# Patient Record
Sex: FEMALE | Race: BLACK OR AFRICAN AMERICAN | Employment: UNEMPLOYED | URBAN - METROPOLITAN AREA
[De-identification: names, ages, dates, MRNs, and addresses within clinical notes are randomized per-mention and may not be internally consistent; named-entity substitution may affect disease eponyms.]

---

## 2021-01-29 ENCOUNTER — OFFICE VISIT (OUTPATIENT)
Dept: OTOLARYNGOLOGY | Facility: CLINIC | Age: 41
End: 2021-01-29
Payer: MEDICAID

## 2021-01-29 VITALS
HEART RATE: 96 BPM | WEIGHT: 145 LBS | TEMPERATURE: 97 F | SYSTOLIC BLOOD PRESSURE: 107 MMHG | HEIGHT: 61 IN | BODY MASS INDEX: 27.38 KG/M2 | DIASTOLIC BLOOD PRESSURE: 83 MMHG

## 2021-01-29 DIAGNOSIS — R49.0 HOARSENESS: Primary | ICD-10-CM

## 2021-01-29 PROCEDURE — 99203 OFFICE O/P NEW LOW 30 MIN: CPT | Performed by: OTOLARYNGOLOGY

## 2021-01-29 PROCEDURE — 3079F DIAST BP 80-89 MM HG: CPT | Performed by: OTOLARYNGOLOGY

## 2021-01-29 PROCEDURE — 3074F SYST BP LT 130 MM HG: CPT | Performed by: OTOLARYNGOLOGY

## 2021-01-29 PROCEDURE — 3008F BODY MASS INDEX DOCD: CPT | Performed by: OTOLARYNGOLOGY

## 2021-01-29 RX ORDER — ALBUTEROL SULFATE 90 UG/1
2 AEROSOL, METERED RESPIRATORY (INHALATION) EVERY 6 HOURS PRN
COMMUNITY
Start: 2020-12-31

## 2021-01-29 RX ORDER — BUSPIRONE HYDROCHLORIDE 7.5 MG/1
7.5 TABLET ORAL NIGHTLY PRN
COMMUNITY
Start: 2021-01-13

## 2021-01-29 RX ORDER — CYCLOBENZAPRINE HYDROCHLORIDE 7.5 MG/1
7.5 TABLET, FILM COATED ORAL 2 TIMES DAILY PRN
COMMUNITY
Start: 2021-01-14

## 2021-01-29 RX ORDER — DESLORATADINE 5 MG/1
TABLET ORAL
COMMUNITY
Start: 2020-10-12

## 2021-01-29 NOTE — PROGRESS NOTES
Tahmina Saul is a 45year old female. Patient presents with:  Voice Problem: voice hoarseness for past month, straining a bit, no pain     HPI:   For the last month or so she has been experiencing a lot of problems with hoarseness in her voice.   She report Normal   Neurological Normal Memory - Normal. Cranial nerves - Cranial nerves II through XII grossly intact.    Neck Exam Normal Inspection - Normal. Palpation - Normal. Parotid gland - Normal. Thyroid gland - Normal.   Psychiatric Normal Orientation - Abdulkadir Hoff

## 2021-02-02 ENCOUNTER — TELEPHONE (OUTPATIENT)
Dept: OTOLARYNGOLOGY | Facility: CLINIC | Age: 41
End: 2021-02-02

## 2021-02-04 ENCOUNTER — TELEPHONE (OUTPATIENT)
Dept: OTOLARYNGOLOGY | Facility: CLINIC | Age: 41
End: 2021-02-04

## 2021-02-04 NOTE — TELEPHONE ENCOUNTER
Rn informed patient that scope in the office has been authorized [id number] valid from 2/2/21 thru 4/3/21,pt verbalized understanding and scheduled.

## 2021-02-06 ENCOUNTER — OFFICE VISIT (OUTPATIENT)
Dept: OTOLARYNGOLOGY | Facility: CLINIC | Age: 41
End: 2021-02-06
Payer: MEDICAID

## 2021-02-06 VITALS
BODY MASS INDEX: 27 KG/M2 | TEMPERATURE: 98 F | DIASTOLIC BLOOD PRESSURE: 70 MMHG | SYSTOLIC BLOOD PRESSURE: 110 MMHG | WEIGHT: 145 LBS

## 2021-02-06 DIAGNOSIS — J38.2 VOCAL CORD NODULE: Primary | ICD-10-CM

## 2021-02-06 PROCEDURE — 31575 DIAGNOSTIC LARYNGOSCOPY: CPT | Performed by: OTOLARYNGOLOGY

## 2021-02-06 PROCEDURE — 3078F DIAST BP <80 MM HG: CPT | Performed by: OTOLARYNGOLOGY

## 2021-02-06 PROCEDURE — 99213 OFFICE O/P EST LOW 20 MIN: CPT | Performed by: OTOLARYNGOLOGY

## 2021-02-06 PROCEDURE — 3074F SYST BP LT 130 MM HG: CPT | Performed by: OTOLARYNGOLOGY

## 2021-02-06 RX ORDER — IBUPROFEN 800 MG/1
800 TABLET ORAL 3 TIMES DAILY PRN
COMMUNITY
Start: 2020-12-11

## 2021-02-06 RX ORDER — FLUCONAZOLE 150 MG/1
150 TABLET ORAL ONCE
COMMUNITY
Start: 2021-01-20

## 2021-02-06 NOTE — PROGRESS NOTES
Erica eLon is a 45year old female. Patient presents with:  Throat Problem: Hoarseness f/u    HPI:   She continues to have a lot of issues with hoarseness in her voice. She is not having any pain in her throat.   She is not having any difficulty eating d Inspection - Normal. Palpation - Normal.   Procedures:  Endoscopy/Laryngoscopy  Pre-Procedure Care: Verbal consent was obtained. Procedure/risks were explained. Questions were answered. Correct patient identified. Correct side and site confirmed.       A to mouth and advanced  into the interior of the larynx. A thorough examination of the interior of the larynx was performed. Findings were as follows.        Hypopharynx/Larynx:  Epiglottis is normal.  Arytenoids:  Bilateral: Arytenoids are normal.  Vocal fol

## 2021-02-17 ENCOUNTER — APPOINTMENT (OUTPATIENT)
Dept: SPEECH THERAPY | Facility: HOSPITAL | Age: 41
End: 2021-02-17
Attending: OTOLARYNGOLOGY
Payer: MEDICAID

## 2021-02-24 ENCOUNTER — OFFICE VISIT (OUTPATIENT)
Dept: SPEECH THERAPY | Facility: HOSPITAL | Age: 41
End: 2021-02-24
Attending: OTOLARYNGOLOGY
Payer: MEDICAID

## 2021-02-24 DIAGNOSIS — J38.2 VOCAL CORD NODULE: ICD-10-CM

## 2021-02-24 PROCEDURE — 92524 BEHAVRAL QUALIT ANALYS VOICE: CPT

## 2021-02-24 NOTE — PATIENT INSTRUCTIONS
VOCAL HYGIENE PROGRAM     Establishing Good Vocal Health    The following suggestions can be implemented to prevent vocal fold pathology, eliminate vocal fold pathology, and to eliminate vocal qualities, which are distracting to the listener.     Andrews Oconnell respiratory    infection    Other general suggestions:   1. Avoid smoking and smoky environments   2. Wear a mask with airborne irritants (mowing lawn, heavy cleaning, etc.)   3.  Be aware of side effects of both prescription and over the counter    medicat

## 2021-02-24 NOTE — PROGRESS NOTES
ADULT VOICE EVALUATION:   Referring Physician: Dr. Valencia Fairly V  Diagnosis: bilateral vocal cord nodules     Date of Service: 2/24/2021     PATIENT Collin Elizabeth is a 45year old y/o female who presents to therapy today with complaints of hoarseness t Consistent and Score: 28/100  Roughness: Mild-Moderately Deviant, Consistent and Score: 25/100  Breathiness: Mild-Moderately Deviant, Consistent and Score: 20/100  Strain: Mild-Moderately Deviant, Consistent and Score: 25/100  Pitch: WNL  Loudness:  WNL min          PLAN OF CARE:    Goals: The patient will adhere to a vocal hygiene program inlcuding increasing completing general and vocal relaxation exercises daily with 90% accuracy with minimal assistance.     The patient will utilize diaphragmatic anat 2/24/2021  To:5/25/2021

## 2021-03-03 ENCOUNTER — APPOINTMENT (OUTPATIENT)
Dept: SPEECH THERAPY | Facility: HOSPITAL | Age: 41
End: 2021-03-03
Attending: OTOLARYNGOLOGY
Payer: MEDICAID

## 2021-03-10 ENCOUNTER — OFFICE VISIT (OUTPATIENT)
Dept: SPEECH THERAPY | Facility: HOSPITAL | Age: 41
End: 2021-03-10
Attending: OTOLARYNGOLOGY
Payer: MEDICAID

## 2021-03-10 PROCEDURE — 92507 TX SP LANG VOICE COMM INDIV: CPT

## 2021-03-10 NOTE — PROGRESS NOTES
Diagnosis:  vocal cord nodules  Authorized # of Visits:  6         Next MD visit: none scheduled  Fall Risk: standard         Precautions: Covid-19 PPE             Subjective: \"My anxiety is really high now. \"  Pt describes high anxiety especially when sh diaphragmatic breathing in conversation with 90% accuracy. The patent will use facial focus and easy onset to phonation in words, sentences and rote language with 90% accuracy.     The patient will reduced strained, hoarse, harsh vocal quality and reduce

## 2021-03-10 NOTE — PATIENT INSTRUCTIONS
VOICE THERAPY EXERCISES FOR FACIAL FOCUS    1. Establish the sound and feel of your voice in the front of your face. Practice:  Prolonged nasal sounds on /n/ and /m/. Humming  2.   Practice prolonged nasal sounds in single syllables feeling the sound com

## 2021-03-17 ENCOUNTER — OFFICE VISIT (OUTPATIENT)
Dept: SPEECH THERAPY | Facility: HOSPITAL | Age: 41
End: 2021-03-17
Attending: OTOLARYNGOLOGY
Payer: MEDICAID

## 2021-03-17 PROCEDURE — 92507 TX SP LANG VOICE COMM INDIV: CPT

## 2021-03-17 NOTE — PROGRESS NOTES
Diagnosis:  vocal cord nodules  Authorized # of Visits:  6         Next MD visit: none scheduled  Fall Risk: standard         Precautions: Covid-19 PPE             Subjective: Pt upset with flat tire. I didn't have my voice for 3 days.   It just came slowl was told that using the techniques takes some practice and work and time and encouraged her to continue practicing and continue therapy for 1-2 more sessions. Goals:    The patient will adhere to a vocal hygiene program inlcuding increasing completing g

## 2021-03-24 ENCOUNTER — TELEPHONE (OUTPATIENT)
Dept: OTOLARYNGOLOGY | Facility: CLINIC | Age: 41
End: 2021-03-24

## 2021-03-24 ENCOUNTER — OFFICE VISIT (OUTPATIENT)
Dept: SPEECH THERAPY | Facility: HOSPITAL | Age: 41
End: 2021-03-24
Attending: OTOLARYNGOLOGY
Payer: MEDICAID

## 2021-03-24 PROCEDURE — 92507 TX SP LANG VOICE COMM INDIV: CPT

## 2021-03-24 NOTE — TELEPHONE ENCOUNTER
Dr Laura Velasquez patient stated the hoarseness got worse ,still on therapy asking if patient can take medication steroid you mentioned on her last visit,please advise.

## 2021-03-24 NOTE — TELEPHONE ENCOUNTER
Per pt requesting to speak to Dr. Leocadia Mcburney regarding medication discussed at last [address]. Please call thank you.

## 2021-03-24 NOTE — PROGRESS NOTES
Diagnosis:  vocal cord nodules  Authorized # of Visits:  6         Next MD visit: none scheduled  Fall Risk: standard         Precautions: Covid-19 PPE             Subjective: Pt upset with flat tire. I didn't have my voice for 3 days.   It just came slowl accuracy which is less than previous sessions. Goals: The patient will adhere to a vocal hygiene program inlcuding increasing completing general and vocal relaxation exercises daily with 90% accuracy with minimal assistance.     The patient will [facility]

## 2021-03-25 ENCOUNTER — TELEPHONE (OUTPATIENT)
Dept: OTOLARYNGOLOGY | Facility: CLINIC | Age: 41
End: 2021-03-25

## 2021-03-25 NOTE — TELEPHONE ENCOUNTER
I would not recommend any other medication right now.   I would have her finish her speech therapy and then follow-up with me if she is still having problems

## 2021-03-25 NOTE — TELEPHONE ENCOUNTER
Per pt was instructed by [facility] with speech therapy that she cannot fill FMLA forms. Pt states that she will drop them off to the office.  Thank you

## 2021-04-01 ENCOUNTER — MED REC SCAN ONLY (OUTPATIENT)
Dept: ADMINISTRATIVE | Age: 41
End: 2021-04-01

## 2021-04-02 NOTE — TELEPHONE ENCOUNTER
Tried to call pt to obtain details for fmla. Unable to place call connection was poor on Remind.  Sent [employer] message to have PT call us to obtain details regarding her fmla request.

## 2021-04-02 NOTE — TELEPHONE ENCOUNTER
Patient needs FMLA to start 3/24/21 and she works 12 hour days, 6 days a week and talks all day. We discussed options and she wants to work so she  wants to do a reduced schedule, 4 days a week max 6-10 hours. This will allow her to rest her voice more.

## 2021-04-03 ENCOUNTER — EKG ENCOUNTER (OUTPATIENT)
Dept: LAB | Facility: HOSPITAL | Age: 41
End: 2021-04-03
Attending: INTERNAL MEDICINE
Payer: MEDICAID

## 2021-04-03 DIAGNOSIS — Z01.818 PRE-OP EXAMINATION: ICD-10-CM

## 2021-04-03 DIAGNOSIS — Z01.818 PRE-OP EXAMINATION: Primary | ICD-10-CM

## 2021-04-03 PROCEDURE — 84703 CHORIONIC GONADOTROPIN ASSAY: CPT

## 2021-04-03 PROCEDURE — 85025 COMPLETE CBC W/AUTO DIFF WBC: CPT

## 2021-04-03 PROCEDURE — 83036 HEMOGLOBIN GLYCOSYLATED A1C: CPT

## 2021-04-03 PROCEDURE — 84439 ASSAY OF FREE THYROXINE: CPT

## 2021-04-03 PROCEDURE — 87389 HIV-1 AG W/HIV-1&-2 AB AG IA: CPT

## 2021-04-03 PROCEDURE — 80053 COMPREHEN METABOLIC PANEL: CPT

## 2021-04-03 PROCEDURE — 36415 COLL VENOUS BLD VENIPUNCTURE: CPT

## 2021-04-03 PROCEDURE — 84443 ASSAY THYROID STIM HORMONE: CPT

## 2021-04-03 PROCEDURE — 85730 THROMBOPLASTIN TIME PARTIAL: CPT

## 2021-04-03 PROCEDURE — 84481 FREE ASSAY (FT-3): CPT

## 2021-04-03 PROCEDURE — 85610 PROTHROMBIN TIME: CPT

## 2021-04-03 PROCEDURE — 93010 ELECTROCARDIOGRAM REPORT: CPT | Performed by: INTERNAL MEDICINE

## 2021-04-03 PROCEDURE — 93005 ELECTROCARDIOGRAM TRACING: CPT

## 2021-04-06 ENCOUNTER — APPOINTMENT (OUTPATIENT)
Dept: SPEECH THERAPY | Facility: HOSPITAL | Age: 41
End: 2021-04-06
Attending: OTOLARYNGOLOGY
Payer: MEDICAID

## 2021-04-06 ENCOUNTER — TELEPHONE (OUTPATIENT)
Dept: SPEECH THERAPY | Facility: HOSPITAL | Age: 41
End: 2021-04-06

## 2021-04-06 NOTE — TELEPHONE ENCOUNTER
Called patient as she NS for her voice therapy session this morning. LM to remind her of her next appointment.   Alon Peacock MA/CCC-SLP  Speech Language Pathologist  [address] [facility]  [phone]

## 2021-04-08 NOTE — TELEPHONE ENCOUNTER
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 Please sign off on form:  -Highlight the patient and hit \"Chart\" button.   -In Chart Review, w/in the Encounter tab - click 1 time on the Telephone call encounter for 3/25/21 Scroll down the telephon

## 2021-04-13 ENCOUNTER — APPOINTMENT (OUTPATIENT)
Dept: SPEECH THERAPY | Facility: HOSPITAL | Age: 41
End: 2021-04-13
Attending: OTOLARYNGOLOGY
Payer: MEDICAID

## 2021-04-27 ENCOUNTER — APPOINTMENT (OUTPATIENT)
Dept: SPEECH THERAPY | Facility: HOSPITAL | Age: 41
End: 2021-04-27
Attending: OTOLARYNGOLOGY
Payer: MEDICAID

## 2021-06-07 ENCOUNTER — TELEPHONE (OUTPATIENT)
Dept: OTOLARYNGOLOGY | Facility: CLINIC | Age: 41
End: 2021-06-07

## 2021-06-07 NOTE — TELEPHONE ENCOUNTER
Pt calling states needs a RTW letter with any restrictions included states returning the 14th please advise       Fax # [phone]  Asking for a call before faxed over

## 2022-02-09 ENCOUNTER — TELEPHONE (OUTPATIENT)
Dept: OBGYN CLINIC | Facility: CLINIC | Age: 42
End: 2022-02-09

## 2022-02-09 NOTE — TELEPHONE ENCOUNTER
LMP 1/12, periods regular every 28 days. Pt agrees to see all providers. OBN appt scheduled on 2/26. Pt states she will want the genetic testing that can find out the gender. Pt states she has 2 sons and if this baby is a boy she will want to terminate. Pt advised to discuss with OBN. Checked with Rafaela Pedro. Gender and genetic testing is done at 12 weeks.

## 2022-02-18 NOTE — TELEPHONE ENCOUNTER
Pt asking when is the earliest she can have genetic testing. Informed pt it is done at 11-13 weeks. Pt states her cousin had testing at 9 weeks. Informed pt nurse does not know what test her cousin did but ours start at 5 weeks. Reminded pt her OBN appt on 2/26 is phone call.

## 2022-02-26 ENCOUNTER — NURSE ONLY (OUTPATIENT)
Dept: OBGYN CLINIC | Facility: CLINIC | Age: 42
End: 2022-02-26
Payer: MEDICAID

## 2022-02-26 ENCOUNTER — LAB ENCOUNTER (OUTPATIENT)
Dept: LAB | Facility: HOSPITAL | Age: 42
End: 2022-02-26
Attending: OBSTETRICS & GYNECOLOGY
Payer: MEDICAID

## 2022-02-26 ENCOUNTER — TELEPHONE (OUTPATIENT)
Dept: OBGYN CLINIC | Facility: CLINIC | Age: 42
End: 2022-02-26

## 2022-02-26 DIAGNOSIS — O26.859 SPOTTING IN EARLY PREGNANCY: ICD-10-CM

## 2022-02-26 DIAGNOSIS — R39.15 URGENCY OF URINATION: ICD-10-CM

## 2022-02-26 DIAGNOSIS — M54.9 ACUTE LEFT-SIDED BACK PAIN, UNSPECIFIED BACK LOCATION: ICD-10-CM

## 2022-02-26 DIAGNOSIS — Z34.81 ENCOUNTER FOR SUPERVISION OF OTHER NORMAL PREGNANCY IN FIRST TRIMESTER: Primary | ICD-10-CM

## 2022-02-26 DIAGNOSIS — Z34.81 ENCOUNTER FOR SUPERVISION OF OTHER NORMAL PREGNANCY IN FIRST TRIMESTER: ICD-10-CM

## 2022-02-26 LAB
ANTIBODY SCREEN: NEGATIVE
B-HCG SERPL-ACNC: ABNORMAL MIU/ML
BASOPHILS # BLD AUTO: 0.03 X10(3) UL (ref 0–0.2)
BASOPHILS NFR BLD AUTO: 0.4 %
BILIRUB UR QL: NEGATIVE
COLOR UR: YELLOW
DEPRECATED RDW RBC AUTO: 44.1 FL (ref 35.1–46.3)
EOSINOPHIL # BLD AUTO: 0.23 X10(3) UL (ref 0–0.7)
EOSINOPHIL NFR BLD AUTO: 2.9 %
ERYTHROCYTE [DISTWIDTH] IN BLOOD BY AUTOMATED COUNT: 11.8 % (ref 11–15)
GLUCOSE UR-MCNC: NEGATIVE MG/DL
HBV SURFACE AG SER-ACNC: <0.1 [IU]/L
HBV SURFACE AG SERPL QL IA: NONREACTIVE
HCT VFR BLD AUTO: 38.5 %
HCV AB SERPL QL IA: NONREACTIVE
HGB BLD-MCNC: 12.5 G/DL
HGB UR QL STRIP.AUTO: NEGATIVE
IMM GRANULOCYTES # BLD AUTO: 0.03 X10(3) UL (ref 0–1)
IMM GRANULOCYTES NFR BLD: 0.4 %
KETONES UR-MCNC: NEGATIVE MG/DL
LYMPHOCYTES # BLD AUTO: 2.4 X10(3) UL (ref 1–4)
LYMPHOCYTES NFR BLD AUTO: 29.9 %
MCH RBC QN AUTO: 33.1 PG (ref 26–34)
MCHC RBC AUTO-ENTMCNC: 32.5 G/DL (ref 31–37)
MCV RBC AUTO: 101.9 FL
MONOCYTES # BLD AUTO: 0.84 X10(3) UL (ref 0.1–1)
MONOCYTES NFR BLD AUTO: 10.5 %
NEUTROPHILS # BLD AUTO: 4.5 X10 (3) UL (ref 1.5–7.7)
NEUTROPHILS # BLD AUTO: 4.5 X10(3) UL (ref 1.5–7.7)
NEUTROPHILS NFR BLD AUTO: 55.9 %
NITRITE UR QL STRIP.AUTO: NEGATIVE
PH UR: 7 [PH] (ref 5–8)
PLATELET # BLD AUTO: 284 10(3)UL (ref 150–450)
PROT UR-MCNC: NEGATIVE MG/DL
RBC # BLD AUTO: 3.78 X10(6)UL
RH BLOOD TYPE: POSITIVE
RUBV IGG SER QL: POSITIVE
RUBV IGG SER-ACNC: 11.7 IU/ML (ref 10–?)
SP GR UR STRIP: 1.02 (ref 1–1.03)
UROBILINOGEN UR STRIP-ACNC: <2
WBC # BLD AUTO: 8 X10(3) UL (ref 4–11)

## 2022-02-26 PROCEDURE — 87086 URINE CULTURE/COLONY COUNT: CPT

## 2022-02-26 PROCEDURE — 87340 HEPATITIS B SURFACE AG IA: CPT

## 2022-02-26 PROCEDURE — 87389 HIV-1 AG W/HIV-1&-2 AB AG IA: CPT

## 2022-02-26 PROCEDURE — 84702 CHORIONIC GONADOTROPIN TEST: CPT

## 2022-02-26 PROCEDURE — 85660 RBC SICKLE CELL TEST: CPT

## 2022-02-26 PROCEDURE — 81001 URINALYSIS AUTO W/SCOPE: CPT

## 2022-02-26 PROCEDURE — 86803 HEPATITIS C AB TEST: CPT

## 2022-02-26 PROCEDURE — 86900 BLOOD TYPING SEROLOGIC ABO: CPT

## 2022-02-26 PROCEDURE — 86850 RBC ANTIBODY SCREEN: CPT

## 2022-02-26 PROCEDURE — 85025 COMPLETE CBC W/AUTO DIFF WBC: CPT

## 2022-02-26 PROCEDURE — 86762 RUBELLA ANTIBODY: CPT

## 2022-02-26 PROCEDURE — 86780 TREPONEMA PALLIDUM: CPT

## 2022-02-26 PROCEDURE — 87077 CULTURE AEROBIC IDENTIFY: CPT

## 2022-02-26 PROCEDURE — 36415 COLL VENOUS BLD VENIPUNCTURE: CPT

## 2022-02-26 PROCEDURE — 86901 BLOOD TYPING SEROLOGIC RH(D): CPT

## 2022-02-26 RX ORDER — CHOLECALCIFEROL (VITAMIN D3) 25 MCG
1 TABLET,CHEWABLE ORAL DAILY
COMMUNITY

## 2022-02-26 NOTE — TELEPHONE ENCOUNTER
Qual order canceled and quant order placed for 2 to be done within 48-72 hours. Pt informed and advised to go to the ER if pain worsens. Pt also advised to call if bleeding increases to a flow. Pt will have blood and urine testing done today. Pt informed doctor on call will be aware of testing to watch for results. Message to [facility] to watch for UA, quant and blood type results.

## 2022-02-26 NOTE — TELEPHONE ENCOUNTER
Pt called service to discuss results. Had episode of spotting  Currently no pain. UA wnl. HCG 47k. O+.     Get US for threatened ab

## 2022-02-26 NOTE — TELEPHONE ENCOUNTER
Pt had her OBN PC appt today. Per LMP pt is 6w3d. Pt states last night she started experiencing left sided low back pain. Pt states it is tender to touch. Pt states it is not really a pain, but more a discomfort. Pt also had some light red spotting with wiping this am.  Pt denies and pelvic pain or cramping. Pt states she drinks a lot of water but her urine is still yellow. She is also experiencing frequency and urgency. States she has a hard time holding her urine. Pt advised frequency can be normal at this stage in the pregnancy. UA ordered to rule out UTI. Pt will come today and do UA and PN blood work, (minus the 1 hr gtt) so we can check blood type. Message to [facility] on call for further recs.

## 2022-02-26 NOTE — PROGRESS NOTES
Pt seen for OBN appt today with no complaints. Normal PN labs, qual, 1 hr gtt, hep c and sickle cell ordered. Pt advised all labs must be completed and resulted prior to MD appt. NPN appt with MD scheduled with CAP on 3/18/2022. Pt would like FTS. Pt does not eat or drink dairy, would like to discuss calcium supplements. Partner's name is Bianca Span contact #pt declines; race:  [place]  Occupation:   Pt race:     MEDICAL HISTORY    Anemia Yes    Anesthetic complications No    Anxiety/Depression  No    Autoimmune Disorder No    Asthma  Yes Seasonal asthma   Cancer No    Diabetes  No    Gyne/breast Surgery Yes Breast implants in 2019   Heart Disease No    Hepatitis/Liver Disease  No    History of blood transfusion No    History of abnormal pap No    Hypertension  No    Infertility  No    Kidney Disease/Frequent UTIs  No    Medication Allergies No    Latex Allergies No    Food Allergies  No    Neurological Disorder/Epilepsy No    Operations/Hospitalizations Yes  in  and   Breast augmentation in   Liposuction in    TB exposure  No    Thyroid Dysfunction No    Trauma/Violence  No    Uterine Anomaly  No    Uterine Fibroids  No    Variocosities/DVTs No    Smoker No    Drug usage in prior year No    Alcohol No    Would you accept a blood transfusion? If no, are you a Tenriism?  Yes    No            INFECTION HISTORY    Chlamydia No    Pt or partner have hx of Genital Herpes No    Gonorrhea No    Hepatitis B No    HIV No    HPV No    MRSA No    Syphilis No    Tattoos Yes Hep c ordered   Live with someone or Exposed to TB No    Rash or viral illness since LMP  No    Varicella No vaccinated   Recent Travel (or planned travel) to [facility] area for self and or partner No    Pets No        GENETICS SCREENING    Genetic Screening    Genetic Screening/Teratology Counseling- Includes patient, baby's father, or anyone in either family with:  Patient's age 28 years or older as of estimated date of delivery: Yes   Thalassemia ([facility], [place], [address], or  background): MCV less than 80: No   Neural tube defect (Meningomyelocele, Spina bifida, or Anencephaly): No   Congenital heart defect: No   Down syndrome: No   Josue-Sachs (Ashkenazi Yazdanism, [place], [place]): No   Canavan disease (Ashkenazi Yazdanism): No   Familial dysautonomia (Ashkenazi Yazdanism): No   Sickle cell disease or trait (): Yes (Comment: Pt's sister has sickle cell trait)   Hemophilia or other blood disorders: No   Muscular dystrophy: No    Cystic fibrosis: No   Marty's chorea: No   Intellectual disability and/or autism: No   Other inherited genetic or chromosomal disorder: No   Maternal metabolic disorder (eg. Type 1 diabetes, PKU): No   Patient or baby's father had child with birth defects not listed above: No   Recurrent pregnancy loss, or a stillbirth: No   Medications (including supplements, vitamins, herbs, or OTC drugs)/illicit/recreational drugs/alcohol since last menstrual period: Yes   If yes, agent(s) and strength/dosage: Pnv               MISC    Infant vaccinations  Yes       Pt. Has answered NO 5P questions and has NO  risk factors. Pt. Given What pregnant women need to know handout.

## 2022-02-26 NOTE — TELEPHONE ENCOUNTER
If pain worsens, needs to go to ER. Monie Chowdhury today & repeat in 48-72 hours.  Look for results & address w/ MD on call

## 2022-02-26 NOTE — TELEPHONE ENCOUNTER
Pt informed of JENNIFER's recs. Pt states she does not want to go to the ER. She states she only had the spotting with wiping once and she is not in pain, just some discomfort in her back. Pt states she did have intercourse last night as well. Pt is at the lab and will be doing her labs. Initial PN labs along with quant and UA. Pt advised again that the doctor feels the ER if the appropriate place to get care at this time. Pt declines going to the ER. JENNIFER informed.

## 2022-02-28 LAB — T PALLIDUM AB SER QL: NEGATIVE

## 2022-02-28 NOTE — TELEPHONE ENCOUNTER
Pt asked why an US is being ordered when she was originally told she could not get one at 13 weeks. Pt also asked why the RN said her hormone levels were high. Pt informed that the hormone levels are high enough that an US can be ordered. If the level was too low, nothing would be seen on an US. The levels are not abnormally high, just high enough that an US can show the baby. Pt informed the US is being ordered because she was having spotting. Pt expressed understanding.

## 2022-02-28 NOTE — TELEPHONE ENCOUNTER
Pt informed of results and recs for US. Order placed.   [facility] [employer] message sent with central scheduling number per pt's request.

## 2022-03-02 LAB — HGB S BLD QL SOLY: NEGATIVE

## 2022-03-03 ENCOUNTER — TELEPHONE (OUTPATIENT)
Dept: OBGYN CLINIC | Facility: CLINIC | Age: 42
End: 2022-03-03

## 2022-03-04 ENCOUNTER — HOSPITAL ENCOUNTER (OUTPATIENT)
Dept: ULTRASOUND IMAGING | Facility: HOSPITAL | Age: 42
Discharge: HOME | End: 2022-03-04
Attending: OBSTETRICS & GYNECOLOGY
Payer: MEDICAID

## 2022-03-04 ENCOUNTER — TELEPHONE (OUTPATIENT)
Dept: OBGYN CLINIC | Facility: CLINIC | Age: 42
End: 2022-03-04

## 2022-03-04 DIAGNOSIS — O20.0 THREATENED ABORTION, ANTEPARTUM: ICD-10-CM

## 2022-03-04 PROCEDURE — 76817 TRANSVAGINAL US OBSTETRIC: CPT | Performed by: OBSTETRICS & GYNECOLOGY

## 2022-03-04 PROCEDURE — 76801 OB US < 14 WKS SINGLE FETUS: CPT | Performed by: OBSTETRICS & GYNECOLOGY

## 2022-03-04 NOTE — TELEPHONE ENCOUNTER
US exam just ended. Result is not finalized, so nothing to review with JENNIFER or patient. Await finalized 1st Trim US report.

## 2022-03-14 ENCOUNTER — TELEPHONE (OUTPATIENT)
Dept: OBGYN CLINIC | Facility: CLINIC | Age: 42
End: 2022-03-14

## 2022-03-14 NOTE — TELEPHONE ENCOUNTER
Patient has been experiencing off and on headaches for last few weeks. Tylenol has not been helpful. Directed to take Tylenol ES prn per box instructions. Should try drinking a serving of caffeine, then resting quietly for 30-60 minutes. Can try cold compressed. She does not think she is drinking 64 oz fluids daily, encouraged to increase fluid intake. She is \"not a big water drinker. \" Advised she can drink flavored or sparkling water if that is more palatable. Patient verbalized understanding. She also notes she has completed all PN labs except 1 hr glucose d/t work schedule. She will try to go Thursday, otherwise Saturday.

## 2022-03-14 NOTE — TELEPHONE ENCOUNTER
Per pt wondering if what else she can take for headaches, states tylenol isnt working.  Please advise

## 2022-03-18 ENCOUNTER — INITIAL PRENATAL (OUTPATIENT)
Dept: OBGYN CLINIC | Facility: CLINIC | Age: 42
End: 2022-03-18
Payer: MEDICAID

## 2022-03-18 ENCOUNTER — TELEPHONE (OUTPATIENT)
Dept: OBGYN CLINIC | Facility: CLINIC | Age: 42
End: 2022-03-18

## 2022-03-18 VITALS
DIASTOLIC BLOOD PRESSURE: 72 MMHG | WEIGHT: 162.81 LBS | SYSTOLIC BLOOD PRESSURE: 107 MMHG | HEART RATE: 91 BPM | HEIGHT: 61.5 IN | BODY MASS INDEX: 30.35 KG/M2

## 2022-03-18 DIAGNOSIS — Z34.81 ENCOUNTER FOR SUPERVISION OF OTHER NORMAL PREGNANCY IN FIRST TRIMESTER: Primary | ICD-10-CM

## 2022-03-18 DIAGNOSIS — N89.8 VAGINAL DISCHARGE: ICD-10-CM

## 2022-03-18 PROBLEM — O09.529 AMA (ADVANCED MATERNAL AGE) MULTIGRAVIDA 35+: Status: ACTIVE | Noted: 2022-03-18

## 2022-03-18 LAB
BILIRUBIN: NEGATIVE
GLUCOSE (URINE DIPSTICK): NEGATIVE MG/DL
KETONES (URINE DIPSTICK): NEGATIVE MG/DL
LEUKOCYTES: NEGATIVE
MULTISTIX EXPIRATION DATE: NORMAL DATE
MULTISTIX LOT#: 1027 NUMERIC
NITRITE, URINE: NEGATIVE
OCCULT BLOOD: NEGATIVE
PH, URINE: 6.5 (ref 4.5–8)
PROTEIN (URINE DIPSTICK): NEGATIVE MG/DL
SPECIFIC GRAVITY: 1.02 (ref 1–1.03)
UROBILINOGEN,SEMI-QN: 0.2 MG/DL (ref 0–1.9)

## 2022-03-18 PROCEDURE — 0500F INITIAL PRENATAL CARE VISIT: CPT | Performed by: OBSTETRICS & GYNECOLOGY

## 2022-03-18 PROCEDURE — 81002 URINALYSIS NONAUTO W/O SCOPE: CPT | Performed by: OBSTETRICS & GYNECOLOGY

## 2022-03-18 PROCEDURE — 3008F BODY MASS INDEX DOCD: CPT | Performed by: OBSTETRICS & GYNECOLOGY

## 2022-03-18 PROCEDURE — 76815 OB US LIMITED FETUS(S): CPT | Performed by: OBSTETRICS & GYNECOLOGY

## 2022-03-18 PROCEDURE — 3074F SYST BP LT 130 MM HG: CPT | Performed by: OBSTETRICS & GYNECOLOGY

## 2022-03-18 PROCEDURE — 3078F DIAST BP <80 MM HG: CPT | Performed by: OBSTETRICS & GYNECOLOGY

## 2022-03-21 LAB — HPV I/H RISK 1 DNA SPEC QL NAA+PROBE: NEGATIVE

## 2022-03-21 RX ORDER — METRONIDAZOLE 500 MG/1
500 TABLET ORAL 2 TIMES DAILY
Qty: 14 TABLET | Refills: 0 | Status: SHIPPED | OUTPATIENT
Start: 2022-03-21 | End: 2022-03-28

## 2022-03-22 LAB
GENITAL VAGINOSIS SCREEN: POSITIVE
TRICHOMONAS SCREEN: NEGATIVE

## 2022-03-28 ENCOUNTER — TELEPHONE (OUTPATIENT)
Dept: OBGYN CLINIC | Facility: CLINIC | Age: 42
End: 2022-03-28

## 2022-03-28 NOTE — TELEPHONE ENCOUNTER
Her pnv should have sufficient vitamins in it already unless she has been told that she has a specific deficiency

## 2022-03-28 NOTE — TELEPHONE ENCOUNTER
10w2d. Pt states she is taking one a day PNV, and has extreme fatigue. Pt is asking if there is anything she can take OTC to help decrease this? Pt informed that in first trimester you can see an increase in fatigue and will see this wean by 2nd trimester. Pt also c/o daily headaches, pt has stopped caffeine abruptly. Pt informed that with pregnancy you can also have vaso dilation in the head, which can cause headaches. Pt instructed to take ES Tylenol, restart caffeine to 300 mcg daily and make sure to drink 64 oz of water daily. Pt states she is not drinking adequate water, and will increase her intake. Pt informed that message will be routed to MD on-call to see if there is anything she can take to decrease fatigue. Pt asking if B12 supplements is ok? To LIZANDRO on-call to review and advise. Thank you.

## 2022-04-04 ENCOUNTER — TELEPHONE (OUTPATIENT)
Dept: OBGYN CLINIC | Facility: CLINIC | Age: 42
End: 2022-04-04

## 2022-04-04 ENCOUNTER — LAB ENCOUNTER (OUTPATIENT)
Dept: LAB | Age: 42
End: 2022-04-04
Attending: OBSTETRICS & GYNECOLOGY
Payer: MEDICAID

## 2022-04-04 DIAGNOSIS — J34.89 STUFFY AND RUNNY NOSE: ICD-10-CM

## 2022-04-04 DIAGNOSIS — R09.89 CHEST CONGESTION: ICD-10-CM

## 2022-04-04 DIAGNOSIS — R05.9 COUGH: ICD-10-CM

## 2022-04-04 DIAGNOSIS — H04.203 WATERY EYES: ICD-10-CM

## 2022-04-04 NOTE — TELEPHONE ENCOUNTER
11w2d. Pt states started yesterday evening, stuffy nose, running nose, watery eyes and cough. Pt asking what she can take while pregnant. Pt given recs regarding OTC medications for cold/flu from the approved office list. Pt also informed will need to take COVID test, pt states she has at home one and will take that. Pt informed if at home is negative will need to take PCR, if at home is positive to please reach out to office. Pt states she does not believe she has COVID, that it is just a sinus infection. Pt informed that with differing variants some present only as sinus infections. Pt informed to consider herself positive until results come back. Recs for comfort care given, and to reach out with any worsening s/s. Pt states understanding, asking that number for CS be sent to [email or web address].

## 2022-04-05 LAB — SARS-COV-2 RNA RESP QL NAA+PROBE: NOT DETECTED

## 2022-04-08 ENCOUNTER — TELEPHONE (OUTPATIENT)
Dept: OBGYN CLINIC | Facility: CLINIC | Age: 42
End: 2022-04-08

## 2022-04-08 NOTE — TELEPHONE ENCOUNTER
Pt is calling said the note that was put in my hcart is not correct asking for you to call her to get note correct ,

## 2022-04-08 NOTE — TELEPHONE ENCOUNTER
Per patient, employer does not yet know she is pregnant. Would like that removed from letter. Letter updated.

## 2022-04-14 ENCOUNTER — ROUTINE PRENATAL (OUTPATIENT)
Dept: OBGYN CLINIC | Facility: CLINIC | Age: 42
End: 2022-04-14
Payer: MEDICAID

## 2022-04-14 VITALS
SYSTOLIC BLOOD PRESSURE: 110 MMHG | BODY MASS INDEX: 32 KG/M2 | HEART RATE: 94 BPM | DIASTOLIC BLOOD PRESSURE: 71 MMHG | WEIGHT: 170 LBS

## 2022-04-14 DIAGNOSIS — Z34.91 ENCOUNTER FOR SUPERVISION OF NORMAL PREGNANCY IN FIRST TRIMESTER, UNSPECIFIED GRAVIDITY: Primary | ICD-10-CM

## 2022-04-14 LAB
APPEARANCE: CLEAR
BILIRUBIN: NEGATIVE
GLUCOSE (URINE DIPSTICK): NEGATIVE MG/DL
KETONES (URINE DIPSTICK): NEGATIVE MG/DL
LEUKOCYTES: NEGATIVE
MULTISTIX LOT#: NORMAL NUMERIC
NITRITE, URINE: NEGATIVE
OCCULT BLOOD: NEGATIVE
PH, URINE: 5 (ref 4.5–8)
PROTEIN (URINE DIPSTICK): NEGATIVE MG/DL
SPECIFIC GRAVITY: 1.02 (ref 1–1.03)
URINE-COLOR: YELLOW
UROBILINOGEN,SEMI-QN: 0.2 MG/DL (ref 0–1.9)

## 2022-04-14 PROCEDURE — 81002 URINALYSIS NONAUTO W/O SCOPE: CPT | Performed by: OBSTETRICS & GYNECOLOGY

## 2022-04-14 PROCEDURE — 0502F SUBSEQUENT PRENATAL CARE: CPT | Performed by: OBSTETRICS & GYNECOLOGY

## 2022-04-14 PROCEDURE — 3078F DIAST BP <80 MM HG: CPT | Performed by: OBSTETRICS & GYNECOLOGY

## 2022-04-14 PROCEDURE — 3074F SYST BP LT 130 MM HG: CPT | Performed by: OBSTETRICS & GYNECOLOGY

## 2022-04-14 NOTE — PROGRESS NOTES
Pt wants FTS--did not realize that the referral/phone number was sent through [employer]. Pt will make appt. RTC 4 wk.

## 2022-04-20 ENCOUNTER — TELEPHONE (OUTPATIENT)
Dept: PERINATAL CARE | Facility: HOSPITAL | Age: 42
End: 2022-04-20

## 2022-04-20 NOTE — TELEPHONE ENCOUNTER
RETURNED PT CALL AND PT DID NOT ANSWER. I LEFT MESSAGE WITH ONLY APPT AVAILABLE FOR 22ND AT 11A AND STRESSED SHE CALL ASAP BECAUSE I AM UNABLE TO HOLD THAT APPOINTMENT MUCH LONGER.

## 2022-04-20 NOTE — TELEPHONE ENCOUNTER
IT IS AFTER 3:15 PM AND AFTER TWO ATTEMPTS IN REACHING PATIENT - THE PATIENT NEVER RETURNED CALL. SINCE THEN, WE HAD TO USE THE AVAIL TIME WE HAD FOR [facility] FOR ANOTHER PATIENT. THE 22ND AT 11A IS NO LONGER AVAILABLE.

## 2022-04-21 ENCOUNTER — TELEPHONE (OUTPATIENT)
Dept: OBGYN CLINIC | Facility: CLINIC | Age: 42
End: 2022-04-21

## 2022-04-21 RX ORDER — BREAST PUMP
EACH MISCELLANEOUS
Qty: 1 EACH | Refills: 0 | Status: SHIPPED | OUTPATIENT
Start: 2022-04-21 | End: 2022-06-18

## 2022-04-21 NOTE — TELEPHONE ENCOUNTER
Received fax from [employer] for double electric breast pump. Order placed in the computer and RX faxed to [employer]. Original sent to scanning.

## 2022-04-23 ENCOUNTER — LAB ENCOUNTER (OUTPATIENT)
Dept: LAB | Facility: HOSPITAL | Age: 42
End: 2022-04-23
Attending: OBSTETRICS & GYNECOLOGY
Payer: MEDICAID

## 2022-04-23 DIAGNOSIS — Z34.81 ENCOUNTER FOR SUPERVISION OF OTHER NORMAL PREGNANCY IN FIRST TRIMESTER: ICD-10-CM

## 2022-04-23 LAB — GLUCOSE 1H P GLC SERPL-MCNC: 129 MG/DL

## 2022-04-23 PROCEDURE — 82950 GLUCOSE TEST: CPT

## 2022-04-23 PROCEDURE — 36415 COLL VENOUS BLD VENIPUNCTURE: CPT

## 2022-04-25 ENCOUNTER — TELEPHONE (OUTPATIENT)
Dept: OBGYN CLINIC | Facility: CLINIC | Age: 42
End: 2022-04-25

## 2022-04-25 NOTE — TELEPHONE ENCOUNTER
Pt calling for 1 hour glucose results. Pt informed 1 hr gtt was 129 and is normal for pregnancy. Pt verbalized understanding.

## 2022-05-05 ENCOUNTER — TELEPHONE (OUTPATIENT)
Dept: OBGYN CLINIC | Facility: CLINIC | Age: 42
End: 2022-05-05

## 2022-05-05 NOTE — TELEPHONE ENCOUNTER
----- Message from Chaz Coyne sent at 5/5/2022 10:56 AM CDT -----  Regarding: [facility] AUTHORIZATION  Hi    Can you please try and reluz KENDRICK for Limited [id number] for her appointment tomorrow (5/6). Demetrice Potts was reviewing charts and caught it.     Thanks  Cinthia Harris

## 2022-05-06 ENCOUNTER — HOSPITAL ENCOUNTER (OUTPATIENT)
Dept: PERINATAL CARE | Facility: HOSPITAL | Age: 42
Discharge: HOME | End: 2022-05-06
Attending: OBSTETRICS & GYNECOLOGY
Payer: MEDICAID

## 2022-05-06 ENCOUNTER — TELEPHONE (OUTPATIENT)
Dept: PERINATAL CARE | Facility: HOSPITAL | Age: 42
End: 2022-05-06

## 2022-05-06 VITALS
WEIGHT: 169 LBS | HEART RATE: 80 BPM | SYSTOLIC BLOOD PRESSURE: 108 MMHG | BODY MASS INDEX: 31 KG/M2 | DIASTOLIC BLOOD PRESSURE: 80 MMHG

## 2022-05-06 DIAGNOSIS — O09.522 MULTIGRAVIDA OF ADVANCED MATERNAL AGE IN SECOND TRIMESTER: Primary | ICD-10-CM

## 2022-05-06 PROCEDURE — 36415 COLL VENOUS BLD VENIPUNCTURE: CPT

## 2022-05-09 ENCOUNTER — TELEPHONE (OUTPATIENT)
Dept: OBGYN CLINIC | Facility: CLINIC | Age: 42
End: 2022-05-09

## 2022-05-09 NOTE — TELEPHONE ENCOUNTER
----- Message from Shaggy Amaya sent at 5/6/2022  1:31 PM CDT -----  Regarding: M AUTHORIZATION  Patient is scheduled on 6-10-22  For  LEVEL 2   [id number]  DX   AMA  Needs PA

## 2022-05-12 ENCOUNTER — TELEPHONE (OUTPATIENT)
Dept: OBGYN CLINIC | Facility: CLINIC | Age: 42
End: 2022-05-12

## 2022-05-12 ENCOUNTER — TELEPHONE (OUTPATIENT)
Dept: PERINATAL CARE | Facility: HOSPITAL | Age: 42
End: 2022-05-12

## 2022-05-12 NOTE — TELEPHONE ENCOUNTER
I called and spoke with the patient about her cell free DNA screen results. There was adequate fetal fraction at 9%. The screening test came back increased risk for Down syndrome with a positive predictive value of 93.4%. His screen came back low risk for trisomy 15, trisomy 25 or sex chromosome aneuploidy. Mirza and I did not discuss the genetic sex of the fetus. I reviewed with her option for amniocentesis for prenatal genetic diagnostic testing. She would like to proceed with amniocentesis. I answered her questions regarding gestational age limits for pregnancy interruption. She will await a call from our scheduling office to set up the appointment. Her obstetrician has been copied on this phone call so that she can have her office start working on a referral for amniocentesis and genetic testing.

## 2022-05-12 NOTE — TELEPHONE ENCOUNTER
Nikhil Boyd called from [place] [phone] and she stated that we need a prior a authorization for the pt. She is having an amniocentesis tomorrow on 5/13 at 10am.   Informed her a message will be sent to [facility] regarding the prior authorization. [facility] to follow up with Herve Spencer tomorrow and call her at [phone].

## 2022-05-12 NOTE — TELEPHONE ENCOUNTER
Spoke with pt. Had question regarding ultrasound that was going to be done with the amnio. I am not quite sure what [facility] had planned--will need to ask them tomorrow.

## 2022-05-12 NOTE — TELEPHONE ENCOUNTER
Pt wishes to speak with DAYAK on call regarding the results she received from [facility]. Was told there is a 93.4% chance the fetus has down syndrome and an amniocentesis was offered. She would like to speak with one of our doctors before she goes through with amnio which is scheduled for tomorrow. Karlee Alonso [phone] paged and message sent.

## 2022-05-13 ENCOUNTER — HOSPITAL ENCOUNTER (OUTPATIENT)
Dept: PERINATAL CARE | Facility: HOSPITAL | Age: 42
Discharge: HOME | End: 2022-05-13
Attending: OBSTETRICS & GYNECOLOGY
Payer: MEDICAID

## 2022-05-13 ENCOUNTER — TELEPHONE (OUTPATIENT)
Dept: OBGYN CLINIC | Facility: CLINIC | Age: 42
End: 2022-05-13

## 2022-05-13 VITALS
HEART RATE: 94 BPM | DIASTOLIC BLOOD PRESSURE: 80 MMHG | SYSTOLIC BLOOD PRESSURE: 127 MMHG | WEIGHT: 169 LBS | BODY MASS INDEX: 31 KG/M2

## 2022-05-13 DIAGNOSIS — O03.9 SAB (SPONTANEOUS ABORTION): Primary | ICD-10-CM

## 2022-05-13 DIAGNOSIS — O28.5 POSITIVE RESULT ON INTEGRATED PRENATAL SCREEN FOR TRISOMY 21: ICD-10-CM

## 2022-05-13 DIAGNOSIS — O09.522 MULTIGRAVIDA OF ADVANCED MATERNAL AGE IN SECOND TRIMESTER: ICD-10-CM

## 2022-05-13 PROCEDURE — 76815 OB US LIMITED FETUS(S): CPT | Performed by: OBSTETRICS & GYNECOLOGY

## 2022-05-13 NOTE — TELEPHONE ENCOUNTER
Received call from [facility] on-call, states received call from Dr. Bryanna Little in [facility] indicating ultrasound done in office today indicated IUFD. Pt desires D&E-Dr. Bryanna Little indicates he will call Vandana Kyle to help facilitate procedure. JLK requesting RN please reach out to pt and inform her that [facility] will be calling her. RN called and offered her condolences to patient and family. Informed pt that staff from [facility] will reach out to her to schedule the D&E. Pt states Dr. Bryanna Little reached out and indicated that she should receive a call by early next week. States Dr. Bryanna Little had explained procedure. Pt informed if she has any questions or concerns to please contact office. Pt states understanding.

## 2022-05-13 NOTE — TELEPHONE ENCOUNTER
Received call from [facility] to obtain PA for limited [address]

## 2022-05-13 NOTE — TELEPHONE ENCOUNTER
Called [id number] and [facility] to [facility] asked what is the CPT code for ultrasound, states will call me back when speaks to richard to confirm correct CPT code is provided

## 2022-05-16 ENCOUNTER — PATIENT MESSAGE (OUTPATIENT)
Dept: PERINATAL CARE | Facility: HOSPITAL | Age: 42
End: 2022-05-16

## 2022-05-16 ENCOUNTER — TELEPHONE (OUTPATIENT)
Dept: PERINATAL CARE | Facility: HOSPITAL | Age: 42
End: 2022-05-16

## 2022-05-17 ENCOUNTER — ANESTHESIA (OUTPATIENT)
Dept: SURGERY | Facility: HOSPITAL | Age: 42
End: 2022-05-17
Payer: MEDICAID

## 2022-05-17 ENCOUNTER — ANESTHESIA EVENT (OUTPATIENT)
Dept: SURGERY | Facility: HOSPITAL | Age: 42
End: 2022-05-17
Payer: MEDICAID

## 2022-05-17 ENCOUNTER — HOSPITAL ENCOUNTER (OUTPATIENT)
Facility: HOSPITAL | Age: 42
Setting detail: HOSPITAL OUTPATIENT SURGERY
Discharge: HOME | End: 2022-05-17
Attending: OBSTETRICS & GYNECOLOGY | Admitting: OBSTETRICS & GYNECOLOGY
Payer: MEDICAID

## 2022-05-17 ENCOUNTER — HOSPITAL ENCOUNTER (OUTPATIENT)
Dept: ULTRASOUND IMAGING | Facility: HOSPITAL | Age: 42
Discharge: HOME | End: 2022-05-17
Attending: OBSTETRICS & GYNECOLOGY
Payer: MEDICAID

## 2022-05-17 VITALS
RESPIRATION RATE: 18 BRPM | DIASTOLIC BLOOD PRESSURE: 76 MMHG | BODY MASS INDEX: 31.6 KG/M2 | HEIGHT: 61.5 IN | HEART RATE: 88 BPM | OXYGEN SATURATION: 100 % | WEIGHT: 169.56 LBS | TEMPERATURE: 97 F | SYSTOLIC BLOOD PRESSURE: 108 MMHG

## 2022-05-17 DIAGNOSIS — O02.1 MISSED ABORTION: ICD-10-CM

## 2022-05-17 LAB
ERYTHROCYTE [DISTWIDTH] IN BLOOD BY AUTOMATED COUNT: 11.9 %
HCT VFR BLD AUTO: 37.5 %
HGB BLD-MCNC: 12.5 G/DL
MCH RBC QN AUTO: 32.1 PG (ref 26–34)
MCHC RBC AUTO-ENTMCNC: 33.3 G/DL (ref 31–37)
MCV RBC AUTO: 96.2 FL
PLATELET # BLD AUTO: 265 10(3)UL (ref 150–450)
RBC # BLD AUTO: 3.9 X10(6)UL
SARS-COV-2 RNA RESP QL NAA+PROBE: NOT DETECTED
WBC # BLD AUTO: 5.3 X10(3) UL (ref 4–11)

## 2022-05-17 PROCEDURE — 10D17Z9 MANUAL EXTRACTION OF PRODUCTS OF CONCEPTION, RETAINED, VIA NATURAL OR ARTIFICIAL OPENING: ICD-10-PCS | Performed by: OBSTETRICS & GYNECOLOGY

## 2022-05-17 PROCEDURE — 85027 COMPLETE CBC AUTOMATED: CPT

## 2022-05-17 PROCEDURE — 76998 US GUIDE INTRAOP: CPT | Performed by: OBSTETRICS & GYNECOLOGY

## 2022-05-17 PROCEDURE — 88305 TISSUE EXAM BY PATHOLOGIST: CPT | Performed by: OBSTETRICS & GYNECOLOGY

## 2022-05-17 RX ORDER — HYDROCODONE BITARTRATE AND ACETAMINOPHEN 5; 325 MG/1; MG/1
2 TABLET ORAL ONCE AS NEEDED
Status: DISCONTINUED | OUTPATIENT
Start: 2022-05-17 | End: 2022-05-17

## 2022-05-17 RX ORDER — ACETAMINOPHEN 500 MG
1000 TABLET ORAL ONCE
Status: DISCONTINUED | OUTPATIENT
Start: 2022-05-17 | End: 2022-05-17 | Stop reason: HOSPADM

## 2022-05-17 RX ORDER — ONDANSETRON 2 MG/ML
4 INJECTION INTRAMUSCULAR; INTRAVENOUS EVERY 6 HOURS PRN
Status: DISCONTINUED | OUTPATIENT
Start: 2022-05-17 | End: 2022-05-17

## 2022-05-17 RX ORDER — SODIUM CHLORIDE, SODIUM LACTATE, POTASSIUM CHLORIDE, CALCIUM CHLORIDE 600; 310; 30; 20 MG/100ML; MG/100ML; MG/100ML; MG/100ML
INJECTION, SOLUTION INTRAVENOUS CONTINUOUS
Status: DISCONTINUED | OUTPATIENT
Start: 2022-05-17 | End: 2022-05-17

## 2022-05-17 RX ORDER — HYDROMORPHONE HYDROCHLORIDE 1 MG/ML
0.4 INJECTION, SOLUTION INTRAMUSCULAR; INTRAVENOUS; SUBCUTANEOUS EVERY 5 MIN PRN
Status: DISCONTINUED | OUTPATIENT
Start: 2022-05-17 | End: 2022-05-17

## 2022-05-17 RX ORDER — DEXAMETHASONE SODIUM PHOSPHATE 4 MG/ML
VIAL (ML) INJECTION AS NEEDED
Status: DISCONTINUED | OUTPATIENT
Start: 2022-05-17 | End: 2022-05-17 | Stop reason: SURG

## 2022-05-17 RX ORDER — SCOLOPAMINE TRANSDERMAL SYSTEM 1 MG/1
1 PATCH, EXTENDED RELEASE TRANSDERMAL ONCE
Status: DISCONTINUED | OUTPATIENT
Start: 2022-05-17 | End: 2022-05-17 | Stop reason: HOSPADM

## 2022-05-17 RX ORDER — HYDROMORPHONE HYDROCHLORIDE 1 MG/ML
0.6 INJECTION, SOLUTION INTRAMUSCULAR; INTRAVENOUS; SUBCUTANEOUS EVERY 5 MIN PRN
Status: DISCONTINUED | OUTPATIENT
Start: 2022-05-17 | End: 2022-05-17

## 2022-05-17 RX ORDER — ONDANSETRON 2 MG/ML
INJECTION INTRAMUSCULAR; INTRAVENOUS AS NEEDED
Status: DISCONTINUED | OUTPATIENT
Start: 2022-05-17 | End: 2022-05-17 | Stop reason: SURG

## 2022-05-17 RX ORDER — LIDOCAINE HYDROCHLORIDE 20 MG/ML
INJECTION, SOLUTION EPIDURAL; INFILTRATION; INTRACAUDAL; PERINEURAL AS NEEDED
Status: DISCONTINUED | OUTPATIENT
Start: 2022-05-17 | End: 2022-05-17 | Stop reason: SURG

## 2022-05-17 RX ORDER — ACETAMINOPHEN 500 MG
1000 TABLET ORAL ONCE AS NEEDED
Status: DISCONTINUED | OUTPATIENT
Start: 2022-05-17 | End: 2022-05-17

## 2022-05-17 RX ORDER — HYDROCODONE BITARTRATE AND ACETAMINOPHEN 5; 325 MG/1; MG/1
1 TABLET ORAL ONCE AS NEEDED
Status: DISCONTINUED | OUTPATIENT
Start: 2022-05-17 | End: 2022-05-17

## 2022-05-17 RX ORDER — PROCHLORPERAZINE EDISYLATE 5 MG/ML
5 INJECTION INTRAMUSCULAR; INTRAVENOUS EVERY 8 HOURS PRN
Status: DISCONTINUED | OUTPATIENT
Start: 2022-05-17 | End: 2022-05-17

## 2022-05-17 RX ORDER — HYDROMORPHONE HYDROCHLORIDE 1 MG/ML
0.2 INJECTION, SOLUTION INTRAMUSCULAR; INTRAVENOUS; SUBCUTANEOUS EVERY 5 MIN PRN
Status: DISCONTINUED | OUTPATIENT
Start: 2022-05-17 | End: 2022-05-17

## 2022-05-17 RX ORDER — KETOROLAC TROMETHAMINE 30 MG/ML
INJECTION, SOLUTION INTRAMUSCULAR; INTRAVENOUS AS NEEDED
Status: DISCONTINUED | OUTPATIENT
Start: 2022-05-17 | End: 2022-05-17 | Stop reason: SURG

## 2022-05-17 RX ADMIN — LIDOCAINE HYDROCHLORIDE 40 MG: 20 INJECTION, SOLUTION EPIDURAL; INFILTRATION; INTRACAUDAL; PERINEURAL at 08:50:00

## 2022-05-17 RX ADMIN — SODIUM CHLORIDE, SODIUM LACTATE, POTASSIUM CHLORIDE, CALCIUM CHLORIDE: 600; 310; 30; 20 INJECTION, SOLUTION INTRAVENOUS at 09:38:00

## 2022-05-17 RX ADMIN — SODIUM CHLORIDE, SODIUM LACTATE, POTASSIUM CHLORIDE, CALCIUM CHLORIDE: 600; 310; 30; 20 INJECTION, SOLUTION INTRAVENOUS at 09:22:00

## 2022-05-17 RX ADMIN — SODIUM CHLORIDE, SODIUM LACTATE, POTASSIUM CHLORIDE, CALCIUM CHLORIDE: 600; 310; 30; 20 INJECTION, SOLUTION INTRAVENOUS at 09:16:00

## 2022-05-17 RX ADMIN — DEXAMETHASONE SODIUM PHOSPHATE 4 MG: 4 MG/ML VIAL (ML) INJECTION at 08:55:00

## 2022-05-17 RX ADMIN — SODIUM CHLORIDE, SODIUM LACTATE, POTASSIUM CHLORIDE, CALCIUM CHLORIDE: 600; 310; 30; 20 INJECTION, SOLUTION INTRAVENOUS at 09:07:00

## 2022-05-17 RX ADMIN — ONDANSETRON 4 MG: 2 INJECTION INTRAMUSCULAR; INTRAVENOUS at 09:15:00

## 2022-05-17 RX ADMIN — KETOROLAC TROMETHAMINE 30 MG: 30 INJECTION, SOLUTION INTRAMUSCULAR; INTRAVENOUS at 09:13:00

## 2022-05-17 NOTE — OPERATIVE REPORT
[facility]  Operative Note    Neli Moralez Patient Status:  Hospital Outpatient Surgery    1980 MRN [id number]   [facility] SURGERY Attending Stu Rodríguez MD   Hosp Day # 0 [facility] Arturo Ariss     Preoperative Diagnosis: MISSED   Postoperative Diagnosis: MISSED   Procedure: dilation and evacuation under ultrasound guidance    Primary surgeon: Brooks Lira MD  Assistant: none      Surgical Findings: 14 week IUFD, complete inventory of fetal parts  Anesthesia: GETA  Complications: None; patient tolerated the procedure well. Specimen: POC  Drains: none  Condition: doing well without problems  Estimated blood loss: 20ml    Procedure:  Patient was taken to operating room where general anesthesia was obtained without difficulty. She was prepped and draped in the usual sterile fashion in the dorsal lithotomy position. EUA revealed 15 week size anteverted uterus. Pre-procedure ultrasound confirmed IUFD measuring 14 weeks and no FHT. Entire procedure completed under ultrasound guidance. [place] metal speculum was placed in the vagina and cervix was identified. Anterior lip of cervix was grasped with a single-toothed tenaculum. The cervix was serially dilated to size 14 dilator. A size 14-curved curette was inserted into the uterus and suction device was activated. Suction was rotated and fluid was removed and fetus was brought to the lower uterine segment. The bierer forceps were inserted into the uterine cavity and fetus was evacuated in parts. The 8-curved curette was then inserted and remaining fluid and placenta were removed. The 10-curved curette was inserted and small amount of remaining placenta was removed. A sharp curettage was then done and a gritty texture was noted at the uterine surface. A final pass with the 8-curved suction was made. Instruments were removed from the vagina. Complete inventory of fetal parts were noted.   Patient tolerated the procedure well. Counts were correct x2.     Nolvia Peres MD  5/17/2022  9:43 AM

## 2022-05-17 NOTE — ANESTHESIA PROCEDURE NOTES
Airway  Date/Time: 5/17/2022 8:56 AM  Urgency: elective    Airway not difficult    General Information and Staff    Patient location during procedure: OR  Anesthesiologist: Michael Savage MD  Performed: anesthesiologist     Indications and Patient Condition  Indications for airway management: anesthesia  Sedation level: deep  Preoxygenated: yes  Patient position: sniffing  Mask difficulty assessment: 1 - vent by mask    Final Airway Details  Final airway type: supraglottic airway      Successful airway: classic (Aura Straight)  Size 4      Number of attempts at approach: 1  Ventilation between attempts: supraglottic airway

## 2022-05-17 NOTE — ANESTHESIA POSTPROCEDURE EVALUATION
[facility] Felipa Pine Patient Status:  Hospital Outpatient Surgery   Age/Gender 37year old female MRN [id number]   [facility] SURGERY Attending Stu Rodríguez MD   [address] Day # 0 PCP Arturo Goddard       Anesthesia Post-op Note    DILATION AND EVACUATION WITH ULTRASOUND GUIDANCE    Procedure Summary     Date: 22 Room / Location: [address] [place] [address] MAIN OR [facility] OR    Anesthesia Start:  Anesthesia Stop: 895    Procedure: DILATION AND EVACUATION WITH ULTRASOUND GUIDANCE (N/A Pelvis) Diagnosis: (MISSED )    Surgeons: Stu Rodríguez MD Anesthesiologist: Heavenly Peraza MD    Anesthesia Type: general ASA Status: 2          Anesthesia Type: general    Vitals Value Taken Time   /87 22 0939   Temp 97.4 22 0939   Pulse 117 22 0939   Resp 23 22 0939   SpO2 98 22 0939       Patient Location: PACU    Anesthesia Type: general    Airway Patency: patent    Postop Pain Control: adequate    Mental Status: preanesthetic baseline    Nausea/Vomiting: none    Cardiopulmonary/Hydration status: stable euvolemic    Complications: no apparent anesthesia related complications    Postop vital signs: stable    Comments: Discussed with Mirza's nurse that I believe Mirza needs additional IV fluids. The 20 ga LEFT PIV infuses slowly without any other sign of infiltration. Dental Exam: Unchanged from Preop    Patient to be discharged from PACU when criteria met.

## 2022-05-21 ENCOUNTER — TELEPHONE (OUTPATIENT)
Dept: OBGYN UNIT | Facility: HOSPITAL | Age: 42
End: 2022-05-21

## 2022-05-26 ENCOUNTER — TELEPHONE (OUTPATIENT)
Dept: OBGYN CLINIC | Facility: CLINIC | Age: 42
End: 2022-05-26

## 2022-05-26 NOTE — TELEPHONE ENCOUNTER
Called pt. States she already has a f/u appt scheduled. Pt reports she had a mab and baby with downs syndrome. Asking if there is testing available to determine risk for trisomy 21 if she becomes pregnant again. Advised pt to discuss with md at her appt. Pt agrees.

## 2022-05-26 NOTE — TELEPHONE ENCOUNTER
Pt had a miscarriage, one of the doctor's sent her to have a procedure.  Pt calling for follow up apt

## 2022-05-31 ENCOUNTER — TELEPHONE (OUTPATIENT)
Dept: OBGYN UNIT | Facility: HOSPITAL | Age: 42
End: 2022-05-31

## 2022-06-02 ENCOUNTER — TELEPHONE (OUTPATIENT)
Dept: OBGYN CLINIC | Facility: CLINIC | Age: 42
End: 2022-06-02

## 2022-06-02 NOTE — TELEPHONE ENCOUNTER
D&E done at [facility], pt calling demanding to be seen either today or tomorrow. States she is still bleeding when she was told her bleeding would stop by two weeks and states her anxiety is high and she feels she needs medication. Pt denies soaking pads or passing clots. Informed that bleeding can be over two weeks, can last to up to 6 wks. Should start to see bleeding taper off. Pt states that's not what she was told and wants to be seen. Pt offered appt with JENNIFER tomorrow at 1010 am. Pt accepts the appt. Pt declines referral to [facility], denies feelings of harming herself or others. Pt instructed to call back if feels occur. Pt asking if appt opens today to please call since she lives 10 mins away.

## 2022-06-02 NOTE — TELEPHONE ENCOUNTER
Patients appointment has been rescheduled to 6/10. Patient has questions and indicates she was told to be seen in 2 weeks, not 3 weeks. Please call at [phone].

## 2022-06-06 ENCOUNTER — TELEPHONE (OUTPATIENT)
Dept: OBGYN CLINIC | Facility: CLINIC | Age: 42
End: 2022-06-06

## 2022-06-06 NOTE — TELEPHONE ENCOUNTER
See messages below.   Messages to MDs to see if pt can be added anywhere first thing in the morning or after 5pm.

## 2022-06-06 NOTE — TELEPHONE ENCOUNTER
17 wk IUFD. D&E done at [facility] 5/17/22. Patient requesting miscarriage follow-up. Patient continues to have small amount of brown or red spotting when wiping at this time. She notes brown vaginal discharge. Advised this is likely old blood mixing with vaginal discharge. She states she just feels uncomfortable with persistent lower abdominal cramping rated 1/10. She states she feels bloated and has occasional chills. Denies fever. She notes an odd vaginal odor that is stronger than typical. She is wondering if she could have retained POC. Pt missed 6/3 appt d/t work schedule. She can only make appt first thing in the morning at 0800 or after 5pm. Would like a provider to add her on this week to be seen. To JENNIFER-Okay to send to MDs for work-in appt? Anything further to do at this time?

## 2022-06-06 NOTE — TELEPHONE ENCOUNTER
No other recs. With a preganncy this large she is going to bleed longer the 2 weeks; this was told to her by the other RNs that spoke to her (see notes). Of course its okay to send to all of the MDs!

## 2022-06-08 NOTE — TELEPHONE ENCOUNTER
lmtcb to notify pt slot became availabe on 6/10 at 8:00 am with CAP. appt booked and need pt confirmation she can make appt?

## 2022-06-09 NOTE — TELEPHONE ENCOUNTER
Pt states she had a positive covid test today on 6/9/22. She has a stuffy nose and sinus discomfort. She has not stopped bleeding, but it is a light pinkish red now. She changes a pad every 3 hrs and a quarter size on the pad. Sent to CAP for recs. Pt had an appt 6/10 at 8am with you. Please advise recs for the pt. Do you want to add pt to the end of your cycle? See notes below. 17 wk IUFD. D&E done at [facility] 5/17/22. Concerned if she has retained POC.

## 2022-06-10 NOTE — TELEPHONE ENCOUNTER
Appt made with PEE on 6/18. Pt is covid positive and asked about quarantine schedule. Pt's symptoms started on 6/7. Pt informed she needs to completely quarantine for 5 full days with day 1 being 6/8. She then needs to wear a mask around other people for 5 more full days. Pt will be able to go out without a mask on 6/18.

## 2022-06-10 NOTE — TELEPHONE ENCOUNTER
I dont have late or weekend hours after her quarantine. She already no showed for an appointment with me.   We can provide work notes as needed but she needs to be flexible

## 2022-06-10 NOTE — TELEPHONE ENCOUNTER
Pt called and informed of CAP recs regarding bleeding. Pt informed that due to +COVID status we would need to appt to 10 days after s/s have started or +test. Pt states s/s started on 6/7/2022 and pt could return to office on 6/17/2022. Pt requesting to be seen either after 5 pm Monday-Friday or on a Saturday. Pt informed that RN can offer what is available. Pt upset, indicates like the providers she also has to work and since we cancelled her first appt due to [employer]" she demands that providers \"fit her in when she requests\". Informed pt that we had providers out ill that week and RN dictating assisted her with being rescheduled the following day of her original appt, which pt no showed. Pt states \"sure did, I couldn't get out of work\". Pt informed that message will be routed to MD's and supervisor to see if pt can be added. 17 wk IUFD w/D&E done at [facility] on 5/17/2022    To CHARLES, MITZI, JENNIFER, PEE to please review and advise if you can added to schedule after 5pm or on a Saturday after 6/17. (LIZANDRO on included due to not being in office)    To Aníbal Ferguson, supervisor to also review.

## 2022-06-10 NOTE — TELEPHONE ENCOUNTER
Checked [facility] schedule from 6/17 through 7/2, no 20 min slot available to book during pts requested times. NJG out of office week of 7/4. Will await other providers response. Thank you.

## 2022-06-10 NOTE — TELEPHONE ENCOUNTER
It is not unusual to have postpartum bleeding for up to 4-6 weeks after delivery. Since bleeding is now light pink to red it appears to be resolving. Reschedule per covid protocol. She can see any of the MD's since I will be on vacation next week.

## 2022-06-18 ENCOUNTER — OFFICE VISIT (OUTPATIENT)
Dept: OBGYN CLINIC | Facility: CLINIC | Age: 42
End: 2022-06-18
Payer: MEDICAID

## 2022-06-18 VITALS
WEIGHT: 164 LBS | SYSTOLIC BLOOD PRESSURE: 101 MMHG | BODY MASS INDEX: 30 KG/M2 | HEART RATE: 82 BPM | DIASTOLIC BLOOD PRESSURE: 70 MMHG

## 2022-06-18 DIAGNOSIS — Z98.890 STATUS POST D&C: Primary | ICD-10-CM

## 2022-07-20 ENCOUNTER — OFFICE VISIT (OUTPATIENT)
Dept: FAMILY MEDICINE CLINIC | Facility: CLINIC | Age: 42
End: 2022-07-20
Payer: MEDICAID

## 2022-07-20 VITALS
TEMPERATURE: 98 F | SYSTOLIC BLOOD PRESSURE: 130 MMHG | DIASTOLIC BLOOD PRESSURE: 78 MMHG | RESPIRATION RATE: 22 BRPM | OXYGEN SATURATION: 98 % | HEART RATE: 88 BPM

## 2022-07-20 DIAGNOSIS — H65.01 ACUTE SEROUS OTITIS MEDIA WITHOUT RUPTURE, RIGHT: Primary | ICD-10-CM

## 2022-07-20 DIAGNOSIS — H92.01 ACUTE OTALGIA, RIGHT: ICD-10-CM

## 2022-07-20 PROCEDURE — 3078F DIAST BP <80 MM HG: CPT

## 2022-07-20 PROCEDURE — 3075F SYST BP GE 130 - 139MM HG: CPT

## 2022-07-20 PROCEDURE — 99203 OFFICE O/P NEW LOW 30 MIN: CPT

## 2022-07-20 RX ORDER — CETIRIZINE HYDROCHLORIDE 10 MG/1
10 TABLET ORAL DAILY
COMMUNITY
Start: 2022-07-06

## 2022-07-20 RX ORDER — PRENATAL VIT/IRON FUM/FOLIC AC 27MG-0.8MG
1 TABLET ORAL DAILY
COMMUNITY
Start: 2022-02-22

## 2022-07-20 RX ORDER — MONTELUKAST SODIUM 10 MG/1
10 TABLET ORAL DAILY
COMMUNITY
Start: 2022-07-11

## 2022-07-20 RX ORDER — DIPHENHYDRAMINE HYDROCHLORIDE 25 MG/1
25 CAPSULE ORAL 3 TIMES DAILY PRN
COMMUNITY
Start: 2022-07-06

## 2022-07-20 NOTE — PATIENT INSTRUCTIONS
See Dr. Dago Gracia for follow up. Call tomorrow. Can try Oxymetazoline to relieve pressure and fluid in middle ear area. Go to Emergency Dept if worsens until seen by Dr. Cortez Neighbours.

## 2022-09-19 ENCOUNTER — HOSPITAL ENCOUNTER (EMERGENCY)
Facility: HOSPITAL | Age: 42
Discharge: HOME | End: 2022-09-19
Attending: STUDENT IN AN ORGANIZED HEALTH CARE EDUCATION/TRAINING PROGRAM

## 2022-09-19 ENCOUNTER — APPOINTMENT (OUTPATIENT)
Dept: GENERAL RADIOLOGY | Facility: HOSPITAL | Age: 42
End: 2022-09-19
Attending: STUDENT IN AN ORGANIZED HEALTH CARE EDUCATION/TRAINING PROGRAM

## 2022-09-19 VITALS
BODY MASS INDEX: 30.21 KG/M2 | OXYGEN SATURATION: 97 % | HEART RATE: 79 BPM | WEIGHT: 160 LBS | RESPIRATION RATE: 18 BRPM | HEIGHT: 61 IN | TEMPERATURE: 97 F | DIASTOLIC BLOOD PRESSURE: 80 MMHG | SYSTOLIC BLOOD PRESSURE: 116 MMHG

## 2022-09-19 DIAGNOSIS — M54.50 ACUTE BILATERAL LOW BACK PAIN WITHOUT SCIATICA: Primary | ICD-10-CM

## 2022-09-19 LAB — B-HCG UR QL: NEGATIVE

## 2022-09-19 PROCEDURE — 96372 THER/PROPH/DIAG INJ SC/IM: CPT

## 2022-09-19 PROCEDURE — 73560 X-RAY EXAM OF KNEE 1 OR 2: CPT | Performed by: STUDENT IN AN ORGANIZED HEALTH CARE EDUCATION/TRAINING PROGRAM

## 2022-09-19 PROCEDURE — 81025 URINE PREGNANCY TEST: CPT

## 2022-09-19 PROCEDURE — 99283 EMERGENCY DEPT VISIT LOW MDM: CPT

## 2022-09-19 RX ORDER — DIAZEPAM 5 MG/1
5 TABLET ORAL ONCE
Status: COMPLETED | OUTPATIENT
Start: 2022-09-19 | End: 2022-09-19

## 2022-09-19 RX ORDER — DIAZEPAM 5 MG/1
5 TABLET ORAL NIGHTLY PRN
Qty: 5 TABLET | Refills: 0 | Status: SHIPPED | OUTPATIENT
Start: 2022-09-19 | End: 2022-09-24

## 2022-09-19 RX ORDER — KETOROLAC TROMETHAMINE 30 MG/ML
30 INJECTION, SOLUTION INTRAMUSCULAR; INTRAVENOUS ONCE
Status: COMPLETED | OUTPATIENT
Start: 2022-09-19 | End: 2022-09-19

## 2022-09-19 RX ORDER — NAPROXEN 500 MG/1
500 TABLET ORAL 2 TIMES DAILY PRN
Qty: 10 TABLET | Refills: 0 | Status: SHIPPED | OUTPATIENT
Start: 2022-09-19 | End: 2022-09-24
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
## 2023-04-19 ENCOUNTER — TELEPHONE (OUTPATIENT)
Dept: OBGYN CLINIC | Facility: CLINIC | Age: 43
End: 2023-04-19

## 2023-04-19 NOTE — TELEPHONE ENCOUNTER
States she had a positive hpt on 4/9/2023. Pt went to a clinic near her home for verification. Pt states the clinic did a pregnancy test, which was positive, but the US did not show anything. They told pt she could be miscarrying or she could have an ectopic and that told pt to call her OB ASAP. Pt states LMP was 3/14 with periods every 28 days. This would make pt 5w1d. Pt advised it may be too early to see anything on the [address]. Pt worried and would like to be seen. Pt's last pregnancy was an IUFD at 14wks. Appt made today with MICHAEL.

## 2023-04-19 NOTE — TELEPHONE ENCOUNTER
Jennifer Soria KT  [facility] 23 11:28 AM  Note  Patient just left a facility to get a  pill, she states the facility could not see the baby and that it may be in her tubes, Patient need a nurse to call for guidance

## 2023-04-19 NOTE — TELEPHONE ENCOUNTER
Patient just left a facility to get a  pill, she states the facility could not see the baby and that it may be in her tubes, Patient need a nurse to call for guidance

## 2023-04-19 NOTE — TELEPHONE ENCOUNTER
Pt just had ultrasound. Could not see anything. Concerned pregnancy could be in tubes or miscarriage. Has taken positive home test. Pt did have miscarriage the same time  Previously (5 weeks). Please call.

## 2023-04-20 ENCOUNTER — OFFICE VISIT (OUTPATIENT)
Dept: OBGYN CLINIC | Facility: CLINIC | Age: 43
End: 2023-04-20

## 2023-04-20 ENCOUNTER — LAB ENCOUNTER (OUTPATIENT)
Dept: LAB | Facility: HOSPITAL | Age: 43
End: 2023-04-20
Attending: NURSE PRACTITIONER
Payer: MEDICAID

## 2023-04-20 VITALS
BODY MASS INDEX: 31 KG/M2 | WEIGHT: 165 LBS | HEART RATE: 106 BPM | DIASTOLIC BLOOD PRESSURE: 79 MMHG | SYSTOLIC BLOOD PRESSURE: 112 MMHG

## 2023-04-20 DIAGNOSIS — Z32.01 PREGNANCY EXAMINATION OR TEST, POSITIVE RESULT: ICD-10-CM

## 2023-04-20 DIAGNOSIS — N92.6 MISSED MENSES: Primary | ICD-10-CM

## 2023-04-20 LAB
ANTIBODY SCREEN: NEGATIVE
B-HCG SERPL-ACNC: 687 MIU/ML
DEPRECATED RDW RBC AUTO: 43.2 FL (ref 35.1–46.3)
ERYTHROCYTE [DISTWIDTH] IN BLOOD BY AUTOMATED COUNT: 11.8 % (ref 11–15)
HCT VFR BLD AUTO: 39.2 %
HGB BLD-MCNC: 12.9 G/DL
MCH RBC QN AUTO: 32.7 PG (ref 26–34)
MCHC RBC AUTO-ENTMCNC: 32.9 G/DL (ref 31–37)
MCV RBC AUTO: 99.5 FL
PLATELET # BLD AUTO: 382 10(3)UL (ref 150–450)
RBC # BLD AUTO: 3.94 X10(6)UL
RH BLOOD TYPE: POSITIVE
WBC # BLD AUTO: 8.8 X10(3) UL (ref 4–11)

## 2023-04-20 PROCEDURE — 86901 BLOOD TYPING SEROLOGIC RH(D): CPT | Performed by: NURSE PRACTITIONER

## 2023-04-20 PROCEDURE — 36415 COLL VENOUS BLD VENIPUNCTURE: CPT

## 2023-04-20 PROCEDURE — 3078F DIAST BP <80 MM HG: CPT | Performed by: NURSE PRACTITIONER

## 2023-04-20 PROCEDURE — 86850 RBC ANTIBODY SCREEN: CPT | Performed by: NURSE PRACTITIONER

## 2023-04-20 PROCEDURE — 3074F SYST BP LT 130 MM HG: CPT | Performed by: NURSE PRACTITIONER

## 2023-04-20 PROCEDURE — 84702 CHORIONIC GONADOTROPIN TEST: CPT

## 2023-04-20 PROCEDURE — 85027 COMPLETE CBC AUTOMATED: CPT

## 2023-04-20 PROCEDURE — 99213 OFFICE O/P EST LOW 20 MIN: CPT | Performed by: NURSE PRACTITIONER

## 2023-04-20 PROCEDURE — 86900 BLOOD TYPING SEROLOGIC ABO: CPT | Performed by: NURSE PRACTITIONER

## 2023-04-21 ENCOUNTER — TELEPHONE (OUTPATIENT)
Dept: OBGYN CLINIC | Facility: CLINIC | Age: 43
End: 2023-04-21

## 2023-04-21 DIAGNOSIS — Z87.59 HISTORY OF MISCARRIAGE: Primary | ICD-10-CM

## 2023-04-21 NOTE — TELEPHONE ENCOUNTER
Pt called and informed for lab results and EMB recs. Pt given strict bleeding/pain and ectopic precautions. Pt states understanding. Quant order placed and hours of [facility] lab given.

## 2023-04-21 NOTE — TELEPHONE ENCOUNTER
on 4/20    HCG consistent with early pregnancy. Repeat HCG on 4/23. Patient should be 5w2d by LMP at yesterdays visit  Please review sab and ectopic precautions.     PATEL Saab

## 2023-04-24 ENCOUNTER — LAB ENCOUNTER (OUTPATIENT)
Dept: LAB | Facility: HOSPITAL | Age: 43
End: 2023-04-24
Attending: NURSE PRACTITIONER
Payer: MEDICAID

## 2023-04-24 ENCOUNTER — TELEPHONE (OUTPATIENT)
Dept: OBGYN CLINIC | Facility: CLINIC | Age: 43
End: 2023-04-24

## 2023-04-24 DIAGNOSIS — O20.0 THREATENED ABORTION, ANTEPARTUM: Primary | ICD-10-CM

## 2023-04-24 DIAGNOSIS — Z87.59 HISTORY OF MISCARRIAGE: ICD-10-CM

## 2023-04-24 LAB — B-HCG SERPL-ACNC: 1585 MIU/ML

## 2023-04-24 PROCEDURE — 36415 COLL VENOUS BLD VENIPUNCTURE: CPT

## 2023-04-24 PROCEDURE — 84702 CHORIONIC GONADOTROPIN TEST: CPT

## 2023-04-24 NOTE — TELEPHONE ENCOUNTER
Called pt back regarding message below. Pt doesn't understand why she needs to repeat hcg quant again and is asking what is going on with her pregnancy. Explained to pt that at this time, we don't have a clear answer to the progression of pregnancy and need repeat hcg in 48 hours for more answers. Pt asking where her hcg level should be at if she is supposed to be around 6 weeks based on LMP. Pt informed that the chart from our lab states that between 5-6 weeks, hcg should be between 10,000-100,000. However, pt informed that we cannot determine next steps based on this and need more information with repeat hcg. Pt asking about ectopic pregnancy and how the pregnancy would be removed if she had an ectopic. Explained that that would be a conversation she has with the MD based on next hcg results but explained methotrexate to pt. Pt DENIES pelvic pain at this time. apologized to pt that we do not have more answers for her at this time, but it is important to repeat hcg in 48 hours. Pt verbalized understanding. STRICT pelvic pain precautions given to pt and instructed she needs to call with any pelvic pain and needs to go to ER with any severe pain/unilateral pain. Pt verbalized understanding. Message to Karlee Alonso [phone] (on call) for sign off.

## 2023-04-24 NOTE — TELEPHONE ENCOUNTER
LMP 3/14, would be 5w6d today by LMP. Regular cycles. Blood type O+  HCG today 4/24 -1585  Prior hcg on 4/20 - 687    HCG increasing but not as expected, repeat hcg in 2 days (4/26 and 4/28). If having any pelvic pain needs US. Review SAB and ectopic precautions. Reviewed with on call Dr. Rosangela Lutz.     Melissa Bowden, APRN

## 2023-04-24 NOTE — TELEPHONE ENCOUNTER
Pt was confused and doesn't want to speek with the same nurse, she didn't understand the way things were explained to her and would like to know why additional testing would be required.     Pls advise

## 2023-04-24 NOTE — TELEPHONE ENCOUNTER
Notified pt of EMB message below. Pt immediately stated she is not doing more blood work. States she cannot keep taking time off work. Pt demanding US or surgery. Informed pt the quant level is not high enough for an OB US at this time. Advised pt she can repeat the quant on 4/26 anytime after 12 pm and okay for pt to go after work. Pt denies pain and bleeding. Advised on pain and bleeding precautions. Pt agrees to repeat quant on 4/26. Await quant.

## 2023-04-28 ENCOUNTER — TELEPHONE (OUTPATIENT)
Dept: OBGYN CLINIC | Facility: CLINIC | Age: 43
End: 2023-04-28

## 2023-04-28 ENCOUNTER — LAB ENCOUNTER (OUTPATIENT)
Dept: LAB | Facility: HOSPITAL | Age: 43
End: 2023-04-28
Attending: NURSE PRACTITIONER
Payer: MEDICAID

## 2023-04-28 DIAGNOSIS — O20.0 THREATENED ABORTION, ANTEPARTUM: ICD-10-CM

## 2023-04-28 DIAGNOSIS — Z34.90 EARLY STAGE OF PREGNANCY: ICD-10-CM

## 2023-04-28 DIAGNOSIS — Z87.59 HISTORY OF IUFD: Primary | ICD-10-CM

## 2023-04-28 DIAGNOSIS — O36.80X0 PREGNANCY WITH UNCERTAIN FETAL VIABILITY, SINGLE OR UNSPECIFIED FETUS: ICD-10-CM

## 2023-04-28 LAB — B-HCG SERPL-ACNC: 4210 MIU/ML

## 2023-04-28 PROCEDURE — 36415 COLL VENOUS BLD VENIPUNCTURE: CPT

## 2023-04-28 PROCEDURE — 84702 CHORIONIC GONADOTROPIN TEST: CPT

## 2023-04-28 NOTE — TELEPHONE ENCOUNTER
Pt called and informed of lab work and recs. Pt denies any s/s of bleeding or pain. Order for ultrasound placed. Pt given strict bleeding and pain precautions. Transferred to  to schedule US, informed should be done within the next 2-3 wks. Pt states understanding.

## 2023-04-28 NOTE — TELEPHONE ENCOUNTER
rev'd lab HCG. Increased. Please call patient and schedule dating US. Would be 6w3d today. Review sab and ectopic precautions. Patient was asymptomatic last lab draw 4 days ago. rev'd with MD on call.

## (undated) DIAGNOSIS — R05.9 COUGH: ICD-10-CM

## (undated) DIAGNOSIS — J34.89 STUFFY AND RUNNY NOSE: Primary | ICD-10-CM

## (undated) DIAGNOSIS — H04.203 WATERY EYES: ICD-10-CM

## (undated) DIAGNOSIS — R09.89 CHEST CONGESTION: ICD-10-CM

## (undated) DIAGNOSIS — O20.0 THREATENED ABORTION, ANTEPARTUM: ICD-10-CM

## (undated) DIAGNOSIS — O26.859 SPOTTING IN EARLY PREGNANCY: Primary | ICD-10-CM

## (undated) DIAGNOSIS — O09.521 AMA (ADVANCED MATERNAL AGE) MULTIGRAVIDA 35+, FIRST TRIMESTER: Primary | ICD-10-CM

## (undated) DEVICE — CURRETTE 10MM CVD

## (undated) DEVICE — GYN CDS: Brand: MEDLINE INDUSTRIES, INC.

## (undated) DEVICE — CURRETTE 8MM CVD

## (undated) DEVICE — STERILE POLYISOPRENE POWDER-FREE SURGICAL GLOVES: Brand: PROTEXIS

## (undated) DEVICE — CANISTER SAFETOUCH SYST DISP

## (undated) DEVICE — SOLUTION  .9 1000ML BTL

## (undated) DEVICE — NEEDLE SPINAL 22X3-1/2 BLK

## (undated) DEVICE — VACURETTE 14MM CURVED 3/8IN

## (undated) DEVICE — TUBING SUCTION COLLECTION SET

## (undated) DEVICE — SLEEVE KENDALL SCD EXPRESS MED

## (undated) NOTE — LETTER
2022                     To Whom it May Concern,         Gloria Xiong  1980 has been under my care for a medical condition. 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          Sincerely,    [place]  [address] [place] [address]  [phone]

## (undated) NOTE — LETTER
4/8/2022                       To Whom it May Concern,     Bee Crespo is a patient under my for pregnancy with a due date of 10/22/2022. 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          Sincerely,    Wendy Parada, DO  [address] [place] [address] [facility]  [phone]

## (undated) NOTE — LETTER
Patient Name: Iman Mccord  YOB: 1980          MRN number:  [id number]  Date:  2/24/2021  Referring Physician:  Trina Zapata EVALUATION:    Referring Physician: Dr. Karlo Simon  Diagnosis: bilateral vocal cord nodules     Da